# Patient Record
Sex: FEMALE | Race: WHITE
[De-identification: names, ages, dates, MRNs, and addresses within clinical notes are randomized per-mention and may not be internally consistent; named-entity substitution may affect disease eponyms.]

---

## 2017-05-25 ENCOUNTER — HOSPITAL ENCOUNTER (OUTPATIENT)
Dept: HOSPITAL 76 - EMS | Age: 42
Discharge: ANOTHER HOSPITAL | End: 2017-05-25
Attending: SURGERY
Payer: COMMERCIAL

## 2017-05-25 ENCOUNTER — HOSPITAL ENCOUNTER (EMERGENCY)
Dept: HOSPITAL 76 - ED | Age: 42
Discharge: HOME | End: 2017-05-25
Payer: COMMERCIAL

## 2017-05-25 VITALS — DIASTOLIC BLOOD PRESSURE: 64 MMHG | SYSTOLIC BLOOD PRESSURE: 121 MMHG
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
## 2017-05-25 DIAGNOSIS — S43.004A: Primary | ICD-10-CM

## 2017-05-25 DIAGNOSIS — S49.81XA: Primary | ICD-10-CM

## 2017-05-25 PROCEDURE — 94770: CPT

## 2017-05-25 PROCEDURE — 99283 EMERGENCY DEPT VISIT LOW MDM: CPT

## 2017-05-25 PROCEDURE — 99284 EMERGENCY DEPT VISIT MOD MDM: CPT

## 2017-05-25 PROCEDURE — 23650 CLTX SHO DSLC W/MNPJ WO ANES: CPT

## 2017-05-25 PROCEDURE — 73030 X-RAY EXAM OF SHOULDER: CPT

## 2017-05-25 PROCEDURE — 99152 MOD SED SAME PHYS/QHP 5/>YRS: CPT

## 2017-05-25 PROCEDURE — 96374 THER/PROPH/DIAG INJ IV PUSH: CPT

## 2017-05-25 NOTE — XRAY REPORT
EXAM:

RIGHT SHOULDER RADIOGRAPHY

 

EXAM DATE: 5/25/2017 08:28 AM.

 

CLINICAL HISTORY: Right shoulder dislocation. Status post reduction.

 

COMPARISON: Earlier study from 05/25/2017.

 

TECHNIQUE: 2 views.

 

FINDINGS: 

Bones: Normal. No fracture or bone lesion.

 

Joints: Interval reduction of right glenohumeral joint dislocation, now in anatomic alignment.

 

Soft tissues: The visualized hemithorax is unremarkable. No soft tissue swelling.

 

IMPRESSION: 

1. Interval reduction of right shoulder dislocation now in anatomic alignment.

 

[facility]

Referring Provider Line: [phone]

 

SITE ID: [id number]

## 2017-05-25 NOTE — XRAY PRELIMINARY REPORT
Accession: [id number]

Exam: XR Shoulder 2 View RT

 

IMPRESSION: 

1. Interval reduction of right shoulder dislocation now in anatomic alignment.

 

[facility]

 

SITE ID: [id number]

## 2017-05-25 NOTE — XRAY REPORT
EXAM:

RIGHT SHOULDER RADIOGRAPHY

 

EXAM DATE: 5/25/2017 07:36 AM.

 

CLINICAL HISTORY: Fall dislocation .

 

COMPARISON: None.

 

TECHNIQUE: 3 views.

 

FINDINGS: 

Bones: No definite fracture or other bone lesion.

 

Joints: No degenerative changes. Humeral head projects medial, inferior, and anterior to the glenoid.


 

Soft tissues: Unremarkable. Clear visualized lung.

 

IMPRESSION: Anterior dislocation of the shoulder.

 

[facility]

Referring Provider Line: [phone]

 

SITE ID: [id number]

## 2017-05-25 NOTE — XRAY PRELIMINARY REPORT
Accession: [id number]

Exam: XR Shoulder 3 View RT

 

IMPRESSION: Anterior dislocation of the shoulder.

 

[facility]

 

SITE ID: [id number]

## 2017-08-01 ENCOUNTER — HOSPITAL ENCOUNTER (OUTPATIENT)
Dept: HOSPITAL 76 - DI | Age: 42
Discharge: HOME | End: 2017-08-01
Attending: ORTHOPAEDIC SURGERY
Payer: COMMERCIAL

## 2017-08-01 DIAGNOSIS — S43.014D: Primary | ICD-10-CM

## 2017-08-01 NOTE — XRAY REPORT
THREE-VIEW RIGHT SHOULDER:  08/01/2017

 

CLINICAL INDICATION:  Followup dislocation.  

 

FINDINGS:  Internal and external rotational views and an axillary view of the right shoulder demonstr
ate no evidence of fracture or dislocation.  The joint spaces are preserved.  No radiopaque foreign b
gely is seen in the soft tissues.  

 

IMPRESSION:  NORMAL RIGHT SHOULDER.  

 

 

 

DD:08/01/2017 11:52:49  DT: 08/01/2017 12:13  JOB #: [id number]  EXT JOB #:[id number]

## 2017-09-07 ENCOUNTER — HOSPITAL ENCOUNTER (OUTPATIENT)
Dept: HOSPITAL 76 - DI | Age: 42
Discharge: HOME | End: 2017-09-07
Attending: ORTHOPAEDIC SURGERY
Payer: COMMERCIAL

## 2017-09-07 DIAGNOSIS — M75.111: Primary | ICD-10-CM

## 2017-09-07 DIAGNOSIS — M89.9: ICD-10-CM

## 2017-09-07 NOTE — MRI REPORT
EXAM:

RIGHT SHOULDER MRI WITHOUT CONTRAST

 

EXAM DATE: 9/7/2017 08:34 AM.

 

CLINICAL HISTORY: Previous right shoulder dislocation on 05/27/2017. Right shoulder pain and decrease
d range of motion. Axillary nerve injury.

 

COMPARISON: Right shoulder radiography from 08/01/2017.

 

TECHNIQUE: Multiplanar, multisequence T1-weighted and fluid-sensitive sequences of the shoulder witho
ut contrast. Other: None. 

 

FINDINGS: 

Acromioclavicular Region: The acromion is type I. The acromioclavicular joint is unremarkable. The co
racoacromial and coracoclavicular ligaments are intact. No subacromial/subdeltoid bursal fluid.

 

Glenohumeral Region: No subluxation. No effusion or loose bodies. The articular cartilage is unremark
able. The glenohumeral ligaments and joint capsule are unremarkable.

 

Bone Marrow: There is a chronic-appearing Hill-Sachs lesion at the posterior superior aspect of the h
umeral head which measures approximately 1.8 cm craniocaudal by 1 cm medial to lateral by 0.5 cm AP. 
No definite bony Bankart lesion is seen. 

 

Labrum: There is a linear T2 hyperintense focus at the posterior aspect of the labrum. There is a eri
ear hyperintense focus either within the anterior aspect of the labrum or between the anterior aspect
 of the labrum and the anterior aspect of the glenohumeral joint capsule.

 

Musculature/Rotator Cuff: There is a tiny, approximately 2 mm low-grade partial thickness intrasubsta
nce tear at the distal end of the supraspinatusinfraspinatus tendon junction (coronal oblique images
 11 and 12 and sagittal oblique image 4). The teres minor and subscapular tendons are intact. No full
-thickness rotator cuff tear. No edema or fatty atrophy.

 

Biceps Tendon: The long head of the biceps tendon and biceps pulley are intact.

 

Other: The subcutaneous tissues are unremarkable. The visualized portions of the axillary nerve are u
nremarkable.

 

IMPRESSION: 

1. Chronic appearing Hill-Sachs lesion at the posterior superior aspect of the humeral head from prev
ious shoulder dislocation injury. No definite bony Bankart lesion is seen. 

2. A linear T2 hyperintense focus at the posterior aspect of the labrum suspicious for a tear. A line
ar T2 hyperintense focus either within the anterior aspect of the labrum or between the anterior aspe
ct of the labrum and anterior aspect of the glenohumeral joint capsule. Differential may include a an
terior labral tear or a perilabral recess. If further evaluation of the labrum and capsular structure
s is desired, a follow-up MR arthrogram may be helpful. 

3. Tiny low-grade partial-thickness intrasubstance insertional tear at the distal end of the supraspi
natusinfraspinatus tendon junction. No full-thickness rotator cuff tear.

 

[facility] MUSCULOSKELET[facility] RADIOLOGY SECTION

Referring Provider Line: [phone]

 

SITE ID: [id number]

## 2019-12-09 ENCOUNTER — HOSPITAL ENCOUNTER (OUTPATIENT)
Dept: HOSPITAL 76 - DI.N | Age: 44
Discharge: HOME | End: 2019-12-09
Attending: GENERAL ACUTE CARE HOSPITAL
Payer: COMMERCIAL

## 2019-12-09 DIAGNOSIS — Z12.31: Primary | ICD-10-CM

## 2019-12-09 PROCEDURE — 77067 SCR MAMMO BI INCL CAD: CPT

## 2019-12-09 NOTE — MAMMOGRAPHY REPORT
Reason:  ROUTINE MAMMO

Procedure Date:  12/09/2019   

Accession Number:  [id number] / [id number]                    

Procedure:  MGN - Screening Mammo Dig Bilat CPT Code:  

 

***Final Report***

 

 

FULL RESULT:

 

 

EXAM: Screening Mammo Dig Bilat

 

DATE: 12/9/2019 10:27 AM

 

CLINICAL HISTORY:  Screening encounter. Baseline examination.

 

TECHNIQUE: (B) - Bilateral  CC and MLO views were obtained.

 

COMPARISON: None

 

PARENCHYMAL PATTERN: (A) - The breast(s) demonstrate(s) scattered 

fibroglandular densities.

 

FINDINGS:

There are no suspicious masses, calcifications, or areas of distortion.

 

IMPRESSION: Negative examination. BI-RADS category 1.

 

RECOMMENDATION: (ANNUAL)  - Recommend routine annual screening 

mammography.

 

BI-RADS CATEGORY: (1) - Negative.

 

STANDARD QUALIFYING STATEMENTS:

1.  This examination was not reviewed with the aid of Computer-Aided 

Detection (CAD).

2.  A negative or benign  imaging report should not preclude biopsy if 

clinically suspicious findings are present.

3.  Dense breasts may obscure an underlying neoplasm.

4. This examination was reviewed without the aid of 3D breast imaging 

(tomosynthesis).

## 2021-10-15 ENCOUNTER — HOSPITAL ENCOUNTER (OUTPATIENT)
Dept: HOSPITAL 76 - DI | Age: 46
Discharge: HOME | End: 2021-10-15
Attending: GENERAL ACUTE CARE HOSPITAL
Payer: COMMERCIAL

## 2021-10-15 DIAGNOSIS — Z12.31: Primary | ICD-10-CM

## 2021-10-18 NOTE — MAMMOGRAPHY REPORT
BILATERAL DIGITAL SCREENING MAMMOGRAM 3D/2D: 10/15/2021

 

CLINICAL: Routine screening.  

 

Comparison is made to exam dated:  12/9/2019 mammogram - [facility].  The tissue of
 both breasts is heterogeneously dense. This may lower the sensitivity of mammography.  

 

No significant masses, calcifications, or other findings are seen in either breast.  

There has been no significant interval change.

 

IMPRESSION: NEGATIVE

There is no mammographic evidence of malignancy. A 1 year screening mammogram is recommended.  

 

 

This exam was interpreted at Station ID: [id number].  

 

NOTE: For mammograms, a report in lay terms will be sent to the patient. Approximately 15% of breast 
malignancies will not be visualized mammographically. In the management of a palpable breast mass, a 
negative mammogram must not discourage biopsy of a clinically suspicious lesion.

 

Electronically Signed By: Michael Lynne M.D., jr/robert:10/15/2021 15:08:49  

 

 

 

ACR BI-RADS Category 1: Negative 3341F

PARENCHYMAL PATTERN: (D) - The breast(s) demonstrate(s) heterogeneously dense fibroglandular ulysses angelo.

BI-RADS CATEGORY: (1) - 1

RECOMMENDATION: (ANNUAL)  - Recommend routine annual screening mammography.

[id number]

1 year screening

LATERALITY: (B)

## 2022-03-13 ENCOUNTER — HOSPITAL ENCOUNTER (EMERGENCY)
Dept: HOSPITAL 76 - ED | Age: 47
Discharge: HOME | End: 2022-03-13
Payer: COMMERCIAL

## 2022-03-13 VITALS — SYSTOLIC BLOOD PRESSURE: 118 MMHG | DIASTOLIC BLOOD PRESSURE: 71 MMHG

## 2022-03-13 DIAGNOSIS — L50.9: Primary | ICD-10-CM

## 2022-03-13 PROCEDURE — 96372 THER/PROPH/DIAG INJ SC/IM: CPT

## 2022-03-13 PROCEDURE — 99282 EMERGENCY DEPT VISIT SF MDM: CPT

## 2022-03-13 PROCEDURE — 99283 EMERGENCY DEPT VISIT LOW MDM: CPT

## 2022-03-13 NOTE — ED PHYSICIAN DOCUMENTATION
History of Present Illness





- Stated complaint


Stated Complaint: ALLERGIC REACTION/SOA/SHAKY





- Chief complaint


Chief Complaint: Allergic Rx





- History obtained from


History obtained from: Patient





- History of Present Illness


Timing: Today


Pain level max: 0


Pain level now: 0





- Additonal information


Additional information: 





Patient is a 46-year-old female who was out for a run today when she developed 

hives, facial swelling and difficulty breathing.  She states that this happened 

before when she was on a run, no known cause.  She has a EpiPen but it was 

. Patient took Benadryl prior to arrival.  The last time this happened 

was also during exercise.





Review of Systems


Constitutional: denies: Fever, Chills


Nose: denies: Rhinorrhea / runny nose, Congestion


Respiratory: denies: Dyspnea, Cough


GI: denies: Abdominal Pain, Nausea, Vomiting, Diarrhea


Skin: denies: Rash


Musculoskeletal: denies: Neck pain, Back pain


Neurologic: denies: Headache





PD PAST MEDICAL HISTORY





- Past Medical History


Past Medical History: Yes





- Past Surgical History


Past Surgical History: Yes


General: Cholecystectomy, Appendectomy


/GYN:  section





- Present Medications


Home Medications: 


                                Ambulatory Orders











 Medication  Instructions  Recorded  Confirmed


 


EPINEPHrine [Epipen 2-Vivek] 0.3 mg IJ ONCE PRN #1 unit 16 


 


Naproxen  17 


 


traMADol [Ultram] 50 mg PO Q6H PRN #15 tablet 17 


 


EPINEPHrine [Epinephrine] 0.3 mg IJ ONCE PRN #1 dis.syr 22 


 


predniSONE [Deltasone] 40 mg PO DAILY #10 tablet 22 














- Allergies


Allergies/Adverse Reactions: 


                                    Allergies











Allergy/AdvReac Type Severity Reaction Status Date / Time


 


acetaminophen [From Percocet] Allergy  Hallucinati Verified 22 18:32





   ons  


 


haloperidol [From Haldol] Allergy  Unknown Verified 22 18:32


 


haloperidol lactate * Allergy  Unknown Verified 22 18:32





[From Haldol]     


 


oxycodone HCl * Allergy  Hallucinati Verified 22 18:32





[From Percocet]   ons  














- Social History


Does the pt smoke?: No


Smoking Status: Never smoker





PD ED PE NORMAL





- Vitals


Vital signs reviewed: Yes





- General


General: Alert and oriented X 3, No acute distress





- HEENT


HEENT: PERRL, Moist mucous membranes





- Neck


Neck: Supple, no meningeal sign





- Cardiac


Cardiac: RRR





- Respiratory


Respiratory: No respiratory distress, Clear bilaterally, Other (No stridor or 

wheezing)





- Abdomen


Abdomen: Soft, Non tender, Non distended





- Derm


Derm: Warm and dry, Other (Diffuse urticaria over the trunk, arms, legs.  Mild 

facial swelling.  Normal phonation.  No trismus)





- Extremities


Extremities: No edema





- Neuro


Neuro: Alert and oriented X 3





- Psych


Psych: Normal mood, Normal affect





Results





- Vitals


Vitals: 


                               Vital Signs - 24 hr











  22





  18:32 20:22 20:50


 


Temperature 36.5 C  36.5 C


 


Heart Rate 100 75 72


 


Respiratory 16 16 16





Rate   


 


Blood Pressure 130/90 H 128/84 H 118/71


 


O2 Saturation 97 99 99








                                     Oxygen











O2 Source                      Room air

















PD MEDICAL DECISION MAKING





- ED course


Complexity details: re-evaluated patient, considered differential, d/w patient


ED course: 





Patient was given IM epinephrine as well as Oral steroids.  She was observed in 

the emergency department with resolution of the urticaria.  No further swelling.

 No difficulty breathing or speaking.  Normal phonation.  No wheezing.  No stri

tyler.  Will prescribe an EpiPen for home as well as steroids.  Unclear etiology, 

possible exercise-induced?  We will have her follow-up with her doctor for 

further care and possible referral to an allergist.  Patient counseled regarding

signs and symptoms for which I believe and urgent re-evaluation would be 

necessary. Patient with good understanding of and agreement to plan and is 

comfortable going home at this time





This document was made in part using voice recognition software. While efforts 

are made to proofread this document, sound alike and grammatical errors may 

occur.





Departure





- Departure


Disposition: 01 Home, Self Care


Clinical Impression: 


 Urticaria





Condition: Good


Instructions:  ED Urticaria


Follow-Up: 


SADA ESCOBAR MD [Primary Care Provider] - Within 1 week


Prescriptions: 


predniSONE [Deltasone] 40 mg PO DAILY #10 tablet


EPINEPHrine [Epinephrine] 0.3 mg IJ ONCE PRN #1 dis.syr


 PRN Reason: Anaphylaxis


Comments: 


The cause of your symptoms are unclear, but there can be many causes for hives 

also known as urticaria.  This can range from environmental exposure, heat and 

cold, food allergens or even exercise-induced.  We will place you on steroids 

for home and recommend you follow-up with an allergist for further care.  Your 

prescriptions were sent to [facility] in [place].


Discharge Date/Time: 22 20:50

## 2023-07-31 ENCOUNTER — HOSPITAL ENCOUNTER (OUTPATIENT)
Dept: HOSPITAL 76 - DI.WOS | Age: 48
Discharge: HOME | End: 2023-07-31
Payer: COMMERCIAL

## 2023-07-31 DIAGNOSIS — M25.511: Primary | ICD-10-CM

## 2023-07-31 NOTE — XRAY REPORT
PROCEDURE:  Shoulder 3 View RT

 

INDICATIONS:  RIGHT SHOULDER PREVIOUS DISLOCATION

 

TECHNIQUE:  4 views of the shoulder were acquired.  

 

COMPARISON:  Right shoulder radiographs 5/26/2023

 

FINDINGS:  

 

Bones:  No acute fractures or dislocations.  No suspicious bony lesions.  Visualized ribs appear inta
ct. Mild AC joint hypertrophy present.

 

Soft tissues:  No suspicious soft tissue calcifications.  

 

 

IMPRESSION:  

 

No acute bony abnormality. If pain persists with conservative management, consider repeat radiographs
 in 10-14 days or cross-sectional imaging.

 

 

Reviewed by: Dayne Velasquez MD on 7/31/2023 12:37 PM PDT

Approved by: Dayne Velasquez MD on 7/31/2023 12:37 PM PDT

 

 

Station ID:  [id number]

## 2023-09-08 ENCOUNTER — HOSPITAL ENCOUNTER (OUTPATIENT)
Dept: HOSPITAL 76 - DI | Age: 48
Discharge: HOME | End: 2023-09-08
Payer: COMMERCIAL

## 2023-09-08 DIAGNOSIS — M67.813: ICD-10-CM

## 2023-09-08 DIAGNOSIS — M75.51: ICD-10-CM

## 2023-09-08 DIAGNOSIS — M24.411: Primary | ICD-10-CM

## 2023-09-08 DIAGNOSIS — M75.111: ICD-10-CM

## 2023-09-11 NOTE — MRI REPORT
PROCEDURE:  SHOULDER WO - RT

 

INDICATIONS:  RECURRENT DISLOCATION SHOULDER

 

TECHNIQUE:  

Noncontrast oblique coronal T2 fast spin echo with fat saturation, oblique sagittal T1 spin echo and 
T2 fast spin echo with fat saturation, axial T1 spin echo and T2 fast spin echo with fat saturation t
hrough the shoulder.  

 

COMPARISON:  X-ray right shoulder, 7/31/2023.. MRI right shoulder, 9/7/2017.

 

FINDINGS:  

Image quality:  Excellent.  

 

Rotator cuff: There is intermediate grade partial-thickness tear along the bursal surface of the dist
al supraspinatus and infraspinatus tendons. There is moderate subscapularis tendinosis.  No rotator c
uff muscle atrophy on sagittal images.  

 

Bones and bursae:  There is a chronic Hill-Sachs deformity. There is a chronic bony Bankart lesion in
 the anterior inferior glenoid with associated anterior inferior labral tear. Bone acute fractures. M
oderate acromioclavicular joint degeneration.  The acromion demonstrates conventional anatomy, withou
t an os acromiale. As a small amount of subcoracoid bursal fluid suggesting mild bursitis.  

 

Capsule and soft tissues:  In the absence of intra-articular contrast, the labrum and glenohumeral li
gaments appear intact.  The long head of the biceps tendon demonstrates normal location and morpholog
y.  The rotator interval appears normal, without fibrosis.  The coracohumeral ligament is normal in t
hickness.  

 

IMPRESSION:  

 

1. Chronic Hill-Sachs deformity humeral head and Bankart lesion in the anterior inferior glenoid/labr
um.

2. Intermediate grade partial-thickness tear of the supraspinatus and infraspinatus tendons.

3. Moderate subscapularis tendinosis.

4. Moderate acromioclavicular tendinosis.

5. Mild subcoracoid bursitis.

 

Reviewed by: Joshua Rasmussen MD on 9/11/2023 8:27 AM PDT

Approved by: Joshua Rasmussen MD on 9/11/2023 8:27 AM PDT

 

 

Station ID:  [id number]